# Patient Record
Sex: FEMALE | Race: ASIAN | NOT HISPANIC OR LATINO | URBAN - METROPOLITAN AREA
[De-identification: names, ages, dates, MRNs, and addresses within clinical notes are randomized per-mention and may not be internally consistent; named-entity substitution may affect disease eponyms.]

---

## 2020-02-29 ENCOUNTER — EMERGENCY (EMERGENCY)
Facility: HOSPITAL | Age: 35
LOS: 1 days | End: 2020-02-29
Attending: EMERGENCY MEDICINE
Payer: COMMERCIAL

## 2020-02-29 VITALS
HEIGHT: 69 IN | OXYGEN SATURATION: 100 % | TEMPERATURE: 98 F | HEART RATE: 91 BPM | SYSTOLIC BLOOD PRESSURE: 126 MMHG | WEIGHT: 169.98 LBS | RESPIRATION RATE: 17 BRPM | DIASTOLIC BLOOD PRESSURE: 83 MMHG

## 2020-02-29 DIAGNOSIS — R22.1 LOCALIZED SWELLING, MASS AND LUMP, NECK: ICD-10-CM

## 2020-02-29 PROCEDURE — 99283 EMERGENCY DEPT VISIT LOW MDM: CPT | Mod: 25

## 2020-02-29 PROCEDURE — 99284 EMERGENCY DEPT VISIT MOD MDM: CPT

## 2020-02-29 PROCEDURE — 31505 DIAGNOSTIC LARYNGOSCOPY: CPT

## 2020-02-29 PROCEDURE — 71046 X-RAY EXAM CHEST 2 VIEWS: CPT | Mod: 26

## 2020-02-29 PROCEDURE — 71046 X-RAY EXAM CHEST 2 VIEWS: CPT

## 2020-02-29 NOTE — ED PROVIDER NOTE - OBJECTIVE STATEMENT
34 year old female with right sided anterior swelling to neck. no prior episodes. no difficulty swallowing. no airway compromise. Patient denies fever, chills. no prior episodes. has history of thyroid nodules that she follows outpatient yearly for an ultrasound. no excessive exercise. did not take antying. 34 year old female with right sided anterior swelling to neck. no prior episodes. no difficulty swallowing. no airway compromise. Patient denies fever, chills. no prior episodes. has history of thyroid nodules that she follows outpatient yearly for an ultrasound. no excessive exercise. did not take anything.

## 2020-02-29 NOTE — ED PROVIDER NOTE - PROGRESS NOTE DETAILS
Bedside Indirect laryngoscopy' s performed by ENT and no emergency findings. Offered soft tissue neck CT but pt stated symptoms improved now and wanted to out f/u with her ENT for reevaluation.

## 2020-02-29 NOTE — ED PROVIDER NOTE - NSFOLLOWUPINSTRUCTIONS_ED_ALL_ED_FT
Your swelling improved while in the ER. ENT did a scope and did not see any airway compromise or any swelling in the oropharynx tract.   Follow up with primary care doctor in 3-5 days.   Follow up with ENT doctor in 3-5 days.   Return to the ER immediately for worsening symptoms, increase in swelling.

## 2020-02-29 NOTE — ED ADULT NURSE REASSESSMENT NOTE - NS ED NURSE REASSESS COMMENT FT1
Report received from RN Vanessa, patient A&Ox3, breathing spontaneous and unlabored. Denies pain at this time, awaiting ENT consult. Bed locked and in lowest position.

## 2020-02-29 NOTE — ED PROVIDER NOTE - CLINICAL SUMMARY MEDICAL DECISION MAKING FREE TEXT BOX
Anne: Patient with right anterior base of neck swelling by clavicle. oropharynx wnl. no tongue swelling.no airway compromise. + mild swelling at scm. will get ent consult, reassess

## 2020-02-29 NOTE — CONSULT NOTE ADULT - ASSESSMENT
34y with pmhx thryoid nodules with x1day of neck swelling R to midline over thyroid gland. no erythema or obvious mass or fluctuance noted.

## 2020-02-29 NOTE — ED ADULT NURSE NOTE - OBJECTIVE STATEMENT
c/o lump since yesterday. HX nodules. No distress. Breathing easy and non labored. Denies difficulty swallowing or pain

## 2020-02-29 NOTE — CONSULT NOTE ADULT - SUBJECTIVE AND OBJECTIVE BOX
CC: neck swelling    HPI: 34 year old female with right sided anterior swelling to neck. no prior episodes. no difficulty swallowing. no airway compromise. Patient denies fever, chills. no prior episodes. has history of thyroid nodules that she follows outpatient yearly for an ultrasound. no excessive exercise. did not take antying.    PAST MEDICAL & SURGICAL HISTORY:    Allergies    No Known Allergies    Intolerances      MEDICATIONS  (STANDING):    MEDICATIONS  (PRN):      Social History: **??**    Family history: Pt denies any sign FHx    ROS:   ENT: all negative except as noted in HPI   CV: denies palpitations  Pulm: denies SOB, cough, hemoptysis  GI: denies change in apetite, indigestion, n/v  : denies pertinent urinary symptoms, urgency  Neuro: denies numbness/tingling, loss of sensation  Psych: denies anxiety  MS: denies muscle weakness, instability  Heme: denies easy bruising or bleeding  Endo: denies heat/cold intolerance, excessive sweating  Vascular: denies LE edema    Vital Signs Last 24 Hrs  T(C): 36.9 (29 Feb 2020 13:22), Max: 36.9 (29 Feb 2020 13:22)  T(F): 98.4 (29 Feb 2020 13:22), Max: 98.4 (29 Feb 2020 13:22)  HR: 91 (29 Feb 2020 13:22) (91 - 91)  BP: 126/83 (29 Feb 2020 13:22) (126/83 - 126/83)  BP(mean): --  RR: 17 (29 Feb 2020 13:22) (17 - 17)  SpO2: 100% (29 Feb 2020 13:22) (100% - 100%)              PHYSICAL EXAM:  Gen: NAD  Skin: No rashes, bruises, or lesions  Head: Normocephalic, Atraumatic  Face: no edema, erythema, or fluctuance. Parotid glands soft without mass  Eyes: no scleral injection  Nose: Nares bilaterally patent, no discharge  Mouth: No Stridor / Drooling / Trismus.  Mucosa moist, tongue/uvula midline, oropharynx clear  Neck: Flat, supple, no lymphadenopathy, trachea midline, no masses  Lymphatic: No lymphadenopathy  Resp: breathing easily, no stridor  CV: no peripheral edema/cyanosis  GI: nondistended   Peripheral vascular: no JVD or edema  Neuro: facial nerve intact, no facial droop        Diagnostic Nasal Endoscopy: (Scope #2 used)    Fiberoptic Indirect laryngoscopy:  (Scope #2 used)        IMAGING/ADDITIONAL STUDIES: CC: neck swelling    HPI: 34 year old female with right sided anterior swelling to neck. no prior episodes. no difficulty swallowing. no airway compromise. Patient denies fever, chills. no prior episodes. has history of thyroid nodules that she follows outpatient yearly for an ultrasound. no excessive exercise. did not take antying. PT reports tenderness has improved since yesterday.      PAST MEDICAL & SURGICAL HISTORY:    Allergies    No Known Allergies    Intolerances      MEDICATIONS  (STANDING):    MEDICATIONS  (PRN):      Social History: **??**    Family history: Pt denies any sign FHx    ROS:   ENT: all negative except as noted in HPI   CV: denies palpitations  Pulm: denies SOB, cough, hemoptysis  GI: denies change in apetite, indigestion, n/v  : denies pertinent urinary symptoms, urgency  Neuro: denies numbness/tingling, loss of sensation  Psych: denies anxiety  MS: denies muscle weakness, instability  Heme: denies easy bruising or bleeding  Endo: denies heat/cold intolerance, excessive sweating  Vascular: denies LE edema    Vital Signs Last 24 Hrs  T(C): 36.9 (29 Feb 2020 13:22), Max: 36.9 (29 Feb 2020 13:22)  T(F): 98.4 (29 Feb 2020 13:22), Max: 98.4 (29 Feb 2020 13:22)  HR: 91 (29 Feb 2020 13:22) (91 - 91)  BP: 126/83 (29 Feb 2020 13:22) (126/83 - 126/83)  BP(mean): --  RR: 17 (29 Feb 2020 13:22) (17 - 17)  SpO2: 100% (29 Feb 2020 13:22) (100% - 100%)              PHYSICAL EXAM:  Gen: NAD  Skin: No rashes, bruises, or lesions  Head: Normocephalic, Atraumatic  Face: no edema, erythema, or fluctuance. Parotid glands soft without mass  Eyes: no scleral injection  Nose: Nares bilaterally patent, no discharge  Mouth: No Stridor / Drooling / Trismus.  Mucosa moist, tongue/uvula midline, oropharynx clear  Neck: minimal puffiness over thyroid, no TTP no masses, no erythema, no fluctuance..  supple, no lymphadenopathy, trachea midline, no masses  Lymphatic: No lymphadenopathy  Resp: breathing easily, no stridor  CV: no peripheral edema/cyanosis  GI: nondistended   Peripheral vascular: no JVD or edema  Neuro: facial nerve intact, no facial droop      indirect laryngoscopy Reason for Laryngoscopy:    Patient was unable to cooperate with mirror.  Nasopharynx, oropharynx, and hypopharynx clear, no bleeding. Tongue base, posterior pharyngeal wall, vallecula, epiglottis, and subglottis appear normal. No erythema, edema, pooling of secretions, masses or lesions. Airway patent, no foreign body visualized. No glottic/supraglottic edema. True vocal cords, arytenoids, vestibular folds, ventricles, pyriform sinuses, and aryepiglottic folds appear normal bilaterally. Vocal cords mobile with good contact b/l.              IMAGING/ADDITIONAL STUDIES: pending CC: neck swelling    HPI: 34 year old female with right sided anterior swelling to neck. no prior episodes. no difficulty swallowing. no airway compromise. Patient denies fever, chills. no prior episodes. has history of thyroid nodules that she follows outpatient yearly for an ultrasound. no excessive exercise. did not take antying. PT reports tenderness has improved since yesterday.      PAST MEDICAL & SURGICAL HISTORY:    Allergies    No Known Allergies    Intolerances      MEDICATIONS  (STANDING):    MEDICATIONS  (PRN):      Social History: no tobacco, no etoh     Family history: Pt denies any sign FHx    ROS:   ENT: all negative except as noted in HPI   CV: denies palpitations  Pulm: denies SOB, cough, hemoptysis  GI: denies change in apetite, indigestion, n/v  : denies pertinent urinary symptoms, urgency  Neuro: denies numbness/tingling, loss of sensation  Psych: denies anxiety  MS: denies muscle weakness, instability  Heme: denies easy bruising or bleeding  Endo: denies heat/cold intolerance, excessive sweating  Vascular: denies LE edema    Vital Signs Last 24 Hrs  T(C): 36.9 (29 Feb 2020 13:22), Max: 36.9 (29 Feb 2020 13:22)  T(F): 98.4 (29 Feb 2020 13:22), Max: 98.4 (29 Feb 2020 13:22)  HR: 91 (29 Feb 2020 13:22) (91 - 91)  BP: 126/83 (29 Feb 2020 13:22) (126/83 - 126/83)  BP(mean): --  RR: 17 (29 Feb 2020 13:22) (17 - 17)  SpO2: 100% (29 Feb 2020 13:22) (100% - 100%)              PHYSICAL EXAM:  Gen: NAD  Skin: No rashes, bruises, or lesions  Head: Normocephalic, Atraumatic  Face: no edema, erythema, or fluctuance. Parotid glands soft without mass  Eyes: no scleral injection  Nose: Nares bilaterally patent, no discharge  Mouth: No Stridor / Drooling / Trismus.  Mucosa moist, tongue/uvula midline, oropharynx clear  Neck: minimal puffiness over thyroid, no TTP no masses, no erythema, no fluctuance..  supple, no lymphadenopathy, trachea midline, no masses  Lymphatic: No lymphadenopathy  Resp: breathing easily, no stridor  CV: no peripheral edema/cyanosis  GI: nondistended   Peripheral vascular: no JVD or edema  Neuro: facial nerve intact, no facial droop      indirect laryngoscopy Reason for Laryngoscopy:    Patient was unable to cooperate with mirror.  Nasopharynx, oropharynx, and hypopharynx clear, no bleeding. Tongue base, posterior pharyngeal wall, vallecula, epiglottis, and subglottis appear normal. No erythema, edema, pooling of secretions, masses or lesions. Airway patent, no foreign body visualized. No glottic/supraglottic edema. True vocal cords, arytenoids, vestibular folds, ventricles, pyriform sinuses, and aryepiglottic folds appear normal bilaterally. Vocal cords mobile with good contact b/l.              IMAGING/ADDITIONAL STUDIES: pending

## 2020-02-29 NOTE — ED PROVIDER NOTE - PATIENT PORTAL LINK FT
You can access the FollowMyHealth Patient Portal offered by [facility] by registering at the following website: [email or web address]. By joining [facility]’s FollowMyHealth portal, you will also be able to view your health information using other applications (apps) compatible with our system.

## 2020-02-29 NOTE — CONSULT NOTE ADULT - PROBLEM SELECTOR RECOMMENDATION 9
- Pt discussed in detail with Dr. Manuel, plan for soft tissue neck CT with con  - will follow results - Pt discussed in detail with Dr. Manuel, plan for soft tissue neck CT with con  - cbc to check wbc  -will follow results

## 2022-08-22 NOTE — CONSULT NOTE ADULT - CONSULT REASON
neck swelling normal... Well appearing, awake, alert, oriented to person, place, time/situation and in no apparent distress.

## 2024-09-10 NOTE — ED PROVIDER NOTE - CONTACT TIME
29-Feb-2020 14:20 [Confusion] : confusion was observed [Memory Lapses Or Loss] : memory lapses or loss [SOB] : no shortness of breath [Syncope] : no syncope [Under Stress] : not under stress [FreeTextEntry2] : postural dizziness
